# Patient Record
Sex: FEMALE | Race: WHITE
[De-identification: names, ages, dates, MRNs, and addresses within clinical notes are randomized per-mention and may not be internally consistent; named-entity substitution may affect disease eponyms.]

---

## 2018-07-05 ENCOUNTER — HOSPITAL ENCOUNTER (OUTPATIENT)
Dept: HOSPITAL 8 - CFH | Age: 68
Discharge: HOME | End: 2018-07-05
Attending: INTERNAL MEDICINE
Payer: MEDICARE

## 2018-07-05 DIAGNOSIS — N63.10: Primary | ICD-10-CM

## 2018-07-05 DIAGNOSIS — R92.1: ICD-10-CM

## 2018-07-05 PROCEDURE — 77065 DX MAMMO INCL CAD UNI: CPT

## 2018-08-16 ENCOUNTER — HOSPITAL ENCOUNTER (OUTPATIENT)
Dept: HOSPITAL 8 - CFH | Age: 68
Discharge: HOME | End: 2018-08-16
Attending: INTERNAL MEDICINE
Payer: MEDICARE

## 2018-08-16 DIAGNOSIS — R91.1: ICD-10-CM

## 2018-08-16 DIAGNOSIS — M85.80: ICD-10-CM

## 2018-08-16 DIAGNOSIS — M81.0: ICD-10-CM

## 2018-08-16 DIAGNOSIS — I25.10: ICD-10-CM

## 2018-08-16 DIAGNOSIS — F17.210: ICD-10-CM

## 2018-08-16 DIAGNOSIS — Z12.2: Primary | ICD-10-CM

## 2018-08-16 PROCEDURE — 77080 DXA BONE DENSITY AXIAL: CPT

## 2018-08-16 PROCEDURE — G0297 LDCT FOR LUNG CA SCREEN: HCPCS

## 2018-09-13 ENCOUNTER — HOSPITAL ENCOUNTER (OUTPATIENT)
Dept: HOSPITAL 8 - CFH | Age: 68
Discharge: HOME | End: 2018-09-13
Attending: PHYSICIAN ASSISTANT
Payer: MEDICARE

## 2018-09-13 DIAGNOSIS — K57.30: ICD-10-CM

## 2018-09-13 DIAGNOSIS — E04.1: ICD-10-CM

## 2018-09-13 DIAGNOSIS — R93.8: Primary | ICD-10-CM

## 2018-09-13 PROCEDURE — 74178 CT ABD&PLV WO CNTR FLWD CNTR: CPT

## 2018-09-13 PROCEDURE — 82565 ASSAY OF CREATININE: CPT

## 2018-10-17 ENCOUNTER — HOSPITAL ENCOUNTER (OUTPATIENT)
Dept: HOSPITAL 8 - STAR | Age: 68
Discharge: HOME | End: 2018-10-17
Attending: UROLOGY
Payer: MEDICARE

## 2018-10-17 DIAGNOSIS — Z01.818: Primary | ICD-10-CM

## 2018-10-17 DIAGNOSIS — C67.9: ICD-10-CM

## 2018-10-17 LAB — MICROSCOPIC: (no result)

## 2018-10-17 PROCEDURE — 87086 URINE CULTURE/COLONY COUNT: CPT

## 2018-10-17 PROCEDURE — 81003 URINALYSIS AUTO W/O SCOPE: CPT

## 2018-10-17 PROCEDURE — 93005 ELECTROCARDIOGRAM TRACING: CPT

## 2018-10-25 ENCOUNTER — HOSPITAL ENCOUNTER (OUTPATIENT)
Dept: HOSPITAL 8 - OUT | Age: 68
Discharge: HOME | End: 2018-10-25
Attending: UROLOGY
Payer: MEDICARE

## 2018-10-25 VITALS — HEIGHT: 66 IN | BODY MASS INDEX: 29.2 KG/M2 | WEIGHT: 181.66 LBS

## 2018-10-25 DIAGNOSIS — C67.9: Primary | ICD-10-CM

## 2018-10-25 DIAGNOSIS — I10: ICD-10-CM

## 2018-10-25 PROCEDURE — 88305 TISSUE EXAM BY PATHOLOGIST: CPT

## 2018-10-25 PROCEDURE — 52234 CYSTOSCOPY AND TREATMENT: CPT

## 2018-10-25 PROCEDURE — 88307 TISSUE EXAM BY PATHOLOGIST: CPT

## 2018-10-25 RX ADMIN — FENTANYL CITRATE PRN MCG: 50 INJECTION INTRAMUSCULAR; INTRAVENOUS at 12:16

## 2018-10-25 RX ADMIN — FENTANYL CITRATE PRN MCG: 50 INJECTION INTRAMUSCULAR; INTRAVENOUS at 12:10

## 2018-12-31 ENCOUNTER — HOSPITAL ENCOUNTER (OUTPATIENT)
Dept: HOSPITAL 8 - OR | Age: 68
Discharge: HOME | End: 2018-12-31
Attending: UROLOGY
Payer: MEDICARE

## 2018-12-31 VITALS — HEIGHT: 66 IN | WEIGHT: 183.65 LBS | BODY MASS INDEX: 29.51 KG/M2

## 2018-12-31 VITALS — SYSTOLIC BLOOD PRESSURE: 145 MMHG | DIASTOLIC BLOOD PRESSURE: 84 MMHG

## 2018-12-31 DIAGNOSIS — Z98.890: ICD-10-CM

## 2018-12-31 DIAGNOSIS — F17.210: ICD-10-CM

## 2018-12-31 DIAGNOSIS — Z72.89: ICD-10-CM

## 2018-12-31 DIAGNOSIS — C67.9: Primary | ICD-10-CM

## 2018-12-31 LAB
CULTURE INDICATED?: NO
MICROSCOPIC: (no result)

## 2018-12-31 PROCEDURE — 52234 CYSTOSCOPY AND TREATMENT: CPT

## 2018-12-31 PROCEDURE — 88305 TISSUE EXAM BY PATHOLOGIST: CPT

## 2018-12-31 PROCEDURE — 81003 URINALYSIS AUTO W/O SCOPE: CPT

## 2019-01-30 ENCOUNTER — HOSPITAL ENCOUNTER (OUTPATIENT)
Dept: HOSPITAL 8 - PETCFH | Age: 69
Discharge: HOME | End: 2019-01-30
Attending: INTERNAL MEDICINE
Payer: MEDICARE

## 2019-01-30 DIAGNOSIS — C67.2: Primary | ICD-10-CM

## 2019-01-30 DIAGNOSIS — M51.36: ICD-10-CM

## 2019-01-30 DIAGNOSIS — N28.1: ICD-10-CM

## 2019-01-30 DIAGNOSIS — D17.71: ICD-10-CM

## 2019-01-30 DIAGNOSIS — R93.89: ICD-10-CM

## 2019-01-30 DIAGNOSIS — R91.1: ICD-10-CM

## 2019-01-30 DIAGNOSIS — M47.819: ICD-10-CM

## 2019-01-30 PROCEDURE — 74177 CT ABD & PELVIS W/CONTRAST: CPT

## 2019-01-30 PROCEDURE — 78306 BONE IMAGING WHOLE BODY: CPT

## 2019-01-30 PROCEDURE — 71260 CT THORAX DX C+: CPT

## 2019-01-30 PROCEDURE — A9503 TC99M MEDRONATE: HCPCS

## 2019-02-12 ENCOUNTER — HOSPITAL ENCOUNTER (OUTPATIENT)
Dept: HOSPITAL 8 - OUT | Age: 69
Discharge: HOME | End: 2019-02-12
Attending: NURSE PRACTITIONER
Payer: MEDICARE

## 2019-02-12 VITALS — BODY MASS INDEX: 31.29 KG/M2 | HEIGHT: 65 IN | WEIGHT: 187.83 LBS

## 2019-02-12 VITALS — DIASTOLIC BLOOD PRESSURE: 89 MMHG | SYSTOLIC BLOOD PRESSURE: 143 MMHG

## 2019-02-12 DIAGNOSIS — Z45.2: Primary | ICD-10-CM

## 2019-02-12 DIAGNOSIS — Z72.89: ICD-10-CM

## 2019-02-12 DIAGNOSIS — Z87.891: ICD-10-CM

## 2019-02-12 DIAGNOSIS — C67.2: ICD-10-CM

## 2019-02-12 PROCEDURE — 77001 FLUOROGUIDE FOR VEIN DEVICE: CPT

## 2019-02-12 PROCEDURE — 36561 INSERT TUNNELED CV CATH: CPT

## 2019-02-12 PROCEDURE — 76937 US GUIDE VASCULAR ACCESS: CPT

## 2019-02-12 PROCEDURE — 99156 MOD SED OTH PHYS/QHP 5/>YRS: CPT

## 2019-02-12 PROCEDURE — C1788 PORT, INDWELLING, IMP: HCPCS

## 2019-02-12 PROCEDURE — 99157 MOD SED OTHER PHYS/QHP EA: CPT

## 2019-04-18 ENCOUNTER — HOSPITAL ENCOUNTER (OUTPATIENT)
Dept: HOSPITAL 8 - CFH | Age: 69
Discharge: HOME | End: 2019-04-18
Attending: UROLOGY
Payer: MEDICARE

## 2019-04-18 DIAGNOSIS — M47.816: ICD-10-CM

## 2019-04-18 DIAGNOSIS — D17.79: ICD-10-CM

## 2019-04-18 DIAGNOSIS — N28.1: ICD-10-CM

## 2019-04-18 DIAGNOSIS — E27.8: ICD-10-CM

## 2019-04-18 DIAGNOSIS — R93.89: ICD-10-CM

## 2019-04-18 DIAGNOSIS — Z85.51: ICD-10-CM

## 2019-04-18 DIAGNOSIS — R91.1: ICD-10-CM

## 2019-04-18 DIAGNOSIS — J84.10: Primary | ICD-10-CM

## 2019-04-18 DIAGNOSIS — N32.89: ICD-10-CM

## 2019-04-18 PROCEDURE — 71260 CT THORAX DX C+: CPT

## 2019-04-18 PROCEDURE — 74177 CT ABD & PELVIS W/CONTRAST: CPT

## 2019-06-18 ENCOUNTER — HOSPITAL ENCOUNTER (OUTPATIENT)
Dept: HOSPITAL 8 - STAR | Age: 69
Discharge: HOME | End: 2019-06-18
Attending: UROLOGY
Payer: MEDICARE

## 2019-06-18 DIAGNOSIS — C67.9: ICD-10-CM

## 2019-06-18 DIAGNOSIS — Z01.818: Primary | ICD-10-CM

## 2019-06-18 LAB
ALBUMIN SERPL-MCNC: 3.8 G/DL (ref 3.4–5)
ALP SERPL-CCNC: 86 U/L (ref 45–117)
ALT SERPL-CCNC: 64 U/L (ref 12–78)
ANION GAP SERPL CALC-SCNC: 8 MMOL/L (ref 5–15)
BASOPHILS # BLD AUTO: 0.05 X10^3/UL (ref 0–0.1)
BASOPHILS NFR BLD AUTO: 1 % (ref 0–1)
BILIRUB SERPL-MCNC: 0.5 MG/DL (ref 0.2–1)
CALCIUM SERPL-MCNC: 9.2 MG/DL (ref 8.5–10.1)
CHLORIDE SERPL-SCNC: 108 MMOL/L (ref 98–107)
CREAT SERPL-MCNC: 0.82 MG/DL (ref 0.55–1.02)
EOSINOPHIL # BLD AUTO: 0.33 X10^3/UL (ref 0–0.4)
EOSINOPHIL NFR BLD AUTO: 5 % (ref 1–7)
ERYTHROCYTE [DISTWIDTH] IN BLOOD BY AUTOMATED COUNT: 12.6 % (ref 9.6–15.2)
LYMPHOCYTES # BLD AUTO: 2.46 X10^3/UL (ref 1–3.4)
LYMPHOCYTES NFR BLD AUTO: 39 % (ref 22–44)
MCH RBC QN AUTO: 33.5 PG (ref 27–34.8)
MCHC RBC AUTO-ENTMCNC: 33.7 G/DL (ref 32.4–35.8)
MCV RBC AUTO: 99.7 FL (ref 80–100)
MD: NO
MICROSCOPIC: (no result)
MONOCYTES # BLD AUTO: 0.4 X10^3/UL (ref 0.2–0.8)
MONOCYTES NFR BLD AUTO: 6 % (ref 2–9)
NEUTROPHILS # BLD AUTO: 3.07 X10^3/UL (ref 1.8–6.8)
NEUTROPHILS NFR BLD AUTO: 49 % (ref 42–75)
PLATELET # BLD AUTO: 251 X10^3/UL (ref 130–400)
PMV BLD AUTO: 7.6 FL (ref 7.4–10.4)
PROT SERPL-MCNC: 7.4 G/DL (ref 6.4–8.2)
RBC # BLD AUTO: 4.57 X10^6/UL (ref 3.82–5.3)

## 2019-06-18 PROCEDURE — 81003 URINALYSIS AUTO W/O SCOPE: CPT

## 2019-06-18 PROCEDURE — 80053 COMPREHEN METABOLIC PANEL: CPT

## 2019-06-18 PROCEDURE — 36415 COLL VENOUS BLD VENIPUNCTURE: CPT

## 2019-06-18 PROCEDURE — 85025 COMPLETE CBC W/AUTO DIFF WBC: CPT

## 2019-06-18 PROCEDURE — 87086 URINE CULTURE/COLONY COUNT: CPT

## 2019-06-18 PROCEDURE — 93005 ELECTROCARDIOGRAM TRACING: CPT

## 2019-06-18 PROCEDURE — 87077 CULTURE AEROBIC IDENTIFY: CPT

## 2019-06-21 ENCOUNTER — HOSPITAL ENCOUNTER (OUTPATIENT)
Dept: HOSPITAL 8 - WOUND | Age: 69
Discharge: HOME | End: 2019-06-21
Attending: FAMILY MEDICINE
Payer: MEDICARE

## 2019-06-21 DIAGNOSIS — T81.89XA: Primary | ICD-10-CM
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
## 2019-11-06 ENCOUNTER — HOSPITAL ENCOUNTER (OUTPATIENT)
Dept: HOSPITAL 8 - WOUND | Age: 69
Discharge: HOME | End: 2019-11-06
Attending: INTERNAL MEDICINE
Payer: MEDICARE

## 2019-11-06 DIAGNOSIS — Z85.51: ICD-10-CM

## 2019-11-06 DIAGNOSIS — F17.200: ICD-10-CM

## 2019-11-06 DIAGNOSIS — Z93.6: Primary | ICD-10-CM

## 2019-11-13 ENCOUNTER — HOSPITAL ENCOUNTER (OUTPATIENT)
Dept: HOSPITAL 8 - WOUND | Age: 69
Discharge: HOME | End: 2019-11-13
Attending: INTERNAL MEDICINE
Payer: MEDICARE

## 2019-11-13 DIAGNOSIS — F17.200: ICD-10-CM

## 2019-11-13 DIAGNOSIS — Z93.6: Primary | ICD-10-CM

## 2019-11-13 DIAGNOSIS — Z85.51: ICD-10-CM

## 2020-02-10 ENCOUNTER — HOSPITAL ENCOUNTER (EMERGENCY)
Dept: HOSPITAL 8 - ED | Age: 70
Discharge: HOME | End: 2020-02-10
Payer: MEDICARE

## 2020-02-10 VITALS — SYSTOLIC BLOOD PRESSURE: 140 MMHG | DIASTOLIC BLOOD PRESSURE: 70 MMHG

## 2020-02-10 VITALS — BODY MASS INDEX: 26.33 KG/M2 | WEIGHT: 163.8 LBS | HEIGHT: 66 IN

## 2020-02-10 DIAGNOSIS — N99.522: Primary | ICD-10-CM

## 2020-02-10 DIAGNOSIS — L24.9: ICD-10-CM

## 2020-02-10 DIAGNOSIS — F17.200: ICD-10-CM

## 2020-02-10 DIAGNOSIS — L03.311: ICD-10-CM

## 2020-02-10 DIAGNOSIS — N30.00: ICD-10-CM

## 2020-02-10 LAB
ALBUMIN SERPL-MCNC: 3.8 G/DL (ref 3.4–5)
ANION GAP SERPL CALC-SCNC: 6 MMOL/L (ref 5–15)
BASOPHILS # BLD AUTO: 0.05 X10^3/UL (ref 0–0.1)
BASOPHILS NFR BLD AUTO: 1 % (ref 0–1)
CALCIUM SERPL-MCNC: 9.1 MG/DL (ref 8.5–10.1)
CHLORIDE SERPL-SCNC: 116 MMOL/L (ref 98–107)
CREAT SERPL-MCNC: 0.78 MG/DL (ref 0.55–1.02)
CULTURE INDICATED?: YES
EOSINOPHIL # BLD AUTO: 0.27 X10^3/UL (ref 0–0.4)
EOSINOPHIL NFR BLD AUTO: 3 % (ref 1–7)
ERYTHROCYTE [DISTWIDTH] IN BLOOD BY AUTOMATED COUNT: 13.8 % (ref 9.6–15.2)
LYMPHOCYTES # BLD AUTO: 2.9 X10^3/UL (ref 1–3.4)
LYMPHOCYTES NFR BLD AUTO: 34 % (ref 22–44)
MCH RBC QN AUTO: 32.4 PG (ref 27–34.8)
MCHC RBC AUTO-ENTMCNC: 33.6 G/DL (ref 32.4–35.8)
MCV RBC AUTO: 96.3 FL (ref 80–100)
MD: NO
MICROSCOPIC: (no result)
MONOCYTES # BLD AUTO: 0.54 X10^3/UL (ref 0.2–0.8)
MONOCYTES NFR BLD AUTO: 6 % (ref 2–9)
NEUTROPHILS # BLD AUTO: 4.74 X10^3/UL (ref 1.8–6.8)
NEUTROPHILS NFR BLD AUTO: 56 % (ref 42–75)
PLATELET # BLD AUTO: 287 X10^3/UL (ref 130–400)
PMV BLD AUTO: 7.6 FL (ref 7.4–10.4)
RBC # BLD AUTO: 4.39 X10^6/UL (ref 3.82–5.3)

## 2020-02-10 PROCEDURE — 87086 URINE CULTURE/COLONY COUNT: CPT

## 2020-02-10 PROCEDURE — 81001 URINALYSIS AUTO W/SCOPE: CPT

## 2020-02-10 PROCEDURE — 87186 SC STD MICRODIL/AGAR DIL: CPT

## 2020-02-10 PROCEDURE — 99283 EMERGENCY DEPT VISIT LOW MDM: CPT

## 2020-02-10 PROCEDURE — 36415 COLL VENOUS BLD VENIPUNCTURE: CPT

## 2020-02-10 PROCEDURE — 87077 CULTURE AEROBIC IDENTIFY: CPT

## 2020-02-10 PROCEDURE — 82040 ASSAY OF SERUM ALBUMIN: CPT

## 2020-02-10 PROCEDURE — 85025 COMPLETE CBC W/AUTO DIFF WBC: CPT

## 2020-02-10 PROCEDURE — 80048 BASIC METABOLIC PNL TOTAL CA: CPT

## 2020-02-10 NOTE — NUR
Removed ostomy bag. Ostomy bright red, with obvious skin break down extending 
to the edge of bag placement. Pt states ostomy looks similar to how it looked 
when it was infected last.

## 2020-02-10 NOTE — NUR
FIRST CONTACT WITH PT. PT STATES "I HAVE A UROSTOMY AND NOW ITS INFECTED." HX 
BLADDER CANCER. REDNESS NOTED AROUND UROSTOMY. PT C/O BURNING SENSATION AROUND 
UROSTOMY BAG. PT'S AOX4. RESPS EVEN AND UNLABORED. BP/SPO2 MONITORS IN PLACE. 
CALL LIGHT WITHIN REACH.

## 2020-02-12 ENCOUNTER — HOSPITAL ENCOUNTER (OUTPATIENT)
Dept: HOSPITAL 8 - WOUND | Age: 70
Discharge: HOME | End: 2020-02-12
Attending: INTERNAL MEDICINE
Payer: MEDICARE

## 2020-02-12 DIAGNOSIS — F17.200: ICD-10-CM

## 2020-02-12 DIAGNOSIS — S31.109D: Primary | ICD-10-CM
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
## 2020-04-07 ENCOUNTER — HOSPITAL ENCOUNTER (OUTPATIENT)
Dept: HOSPITAL 8 - WOUND | Age: 70
Discharge: HOME | End: 2020-04-07
Attending: NURSE PRACTITIONER
Payer: MEDICARE

## 2020-04-07 DIAGNOSIS — F17.200: ICD-10-CM

## 2020-04-07 DIAGNOSIS — Z93.6: Primary | ICD-10-CM

## 2020-04-07 DIAGNOSIS — Z85.51: ICD-10-CM

## 2020-04-14 ENCOUNTER — HOSPITAL ENCOUNTER (OUTPATIENT)
Dept: HOSPITAL 8 - WOUND | Age: 70
Discharge: HOME | End: 2020-04-14
Attending: NURSE PRACTITIONER
Payer: MEDICARE

## 2020-04-14 DIAGNOSIS — F17.200: ICD-10-CM

## 2020-04-14 DIAGNOSIS — Z93.6: Primary | ICD-10-CM

## 2020-04-14 DIAGNOSIS — Z85.51: ICD-10-CM

## 2020-06-23 ENCOUNTER — HOSPITAL ENCOUNTER (OUTPATIENT)
Dept: HOSPITAL 8 - CFH | Age: 70
Discharge: HOME | End: 2020-06-23
Attending: UROLOGY
Payer: MEDICARE

## 2020-06-23 DIAGNOSIS — M51.36: ICD-10-CM

## 2020-06-23 DIAGNOSIS — C67.9: Primary | ICD-10-CM

## 2020-06-23 PROCEDURE — 82565 ASSAY OF CREATININE: CPT

## 2020-06-23 PROCEDURE — 71046 X-RAY EXAM CHEST 2 VIEWS: CPT

## 2020-06-23 PROCEDURE — 74178 CT ABD&PLV WO CNTR FLWD CNTR: CPT

## 2020-07-13 ENCOUNTER — HOSPITAL ENCOUNTER (OUTPATIENT)
Dept: HOSPITAL 8 - OUT | Age: 70
Discharge: HOME | End: 2020-07-13
Attending: INTERNAL MEDICINE
Payer: MEDICARE

## 2020-07-13 VITALS — HEIGHT: 65 IN | WEIGHT: 168.65 LBS | BODY MASS INDEX: 28.1 KG/M2

## 2020-07-13 VITALS — DIASTOLIC BLOOD PRESSURE: 81 MMHG | SYSTOLIC BLOOD PRESSURE: 146 MMHG

## 2020-07-13 DIAGNOSIS — Z45.2: Primary | ICD-10-CM

## 2020-07-13 DIAGNOSIS — F17.200: ICD-10-CM

## 2020-07-13 DIAGNOSIS — C67.2: ICD-10-CM

## 2020-07-13 PROCEDURE — 99157 MOD SED OTHER PHYS/QHP EA: CPT

## 2020-07-13 PROCEDURE — 99156 MOD SED OTH PHYS/QHP 5/>YRS: CPT

## 2020-07-13 PROCEDURE — 36590 REMOVAL TUNNELED CV CATH: CPT

## 2020-07-13 PROCEDURE — 77001 FLUOROGUIDE FOR VEIN DEVICE: CPT

## 2020-09-04 ENCOUNTER — HOSPITAL ENCOUNTER (OUTPATIENT)
Dept: HOSPITAL 8 - WOUND | Age: 70
Discharge: HOME | End: 2020-09-04
Attending: FAMILY MEDICINE
Payer: MEDICARE

## 2020-09-04 DIAGNOSIS — Z93.6: ICD-10-CM

## 2020-09-04 DIAGNOSIS — Z85.51: ICD-10-CM

## 2020-09-04 DIAGNOSIS — R21: ICD-10-CM

## 2020-09-04 DIAGNOSIS — F17.200: Primary | ICD-10-CM

## 2021-09-13 ENCOUNTER — HOSPITAL ENCOUNTER (OUTPATIENT)
Dept: HOSPITAL 8 - CFH | Age: 71
Discharge: HOME | End: 2021-09-13
Attending: INTERNAL MEDICINE
Payer: MEDICARE

## 2021-09-13 DIAGNOSIS — N95.8: ICD-10-CM

## 2021-09-13 DIAGNOSIS — R91.1: ICD-10-CM

## 2021-09-13 DIAGNOSIS — Z12.31: Primary | ICD-10-CM

## 2021-09-13 DIAGNOSIS — N64.89: ICD-10-CM

## 2021-09-13 DIAGNOSIS — J43.9: ICD-10-CM

## 2021-09-13 DIAGNOSIS — M85.88: ICD-10-CM

## 2021-09-13 PROCEDURE — 82565 ASSAY OF CREATININE: CPT

## 2021-09-13 PROCEDURE — 77067 SCR MAMMO BI INCL CAD: CPT

## 2021-09-13 PROCEDURE — 77063 BREAST TOMOSYNTHESIS BI: CPT

## 2021-09-13 PROCEDURE — 71260 CT THORAX DX C+: CPT

## 2021-09-13 PROCEDURE — 77080 DXA BONE DENSITY AXIAL: CPT
